# Patient Record
Sex: FEMALE | Race: WHITE
[De-identification: names, ages, dates, MRNs, and addresses within clinical notes are randomized per-mention and may not be internally consistent; named-entity substitution may affect disease eponyms.]

---

## 2019-03-29 ENCOUNTER — HOSPITAL ENCOUNTER (INPATIENT)
Dept: HOSPITAL 74 - YASAS | Age: 44
LOS: 4 days | Discharge: HOME | DRG: 773 | End: 2019-04-02
Attending: SURGERY | Admitting: SURGERY
Payer: COMMERCIAL

## 2019-03-29 VITALS — BODY MASS INDEX: 27.8 KG/M2

## 2019-03-29 DIAGNOSIS — E10.9: ICD-10-CM

## 2019-03-29 DIAGNOSIS — G47.00: ICD-10-CM

## 2019-03-29 DIAGNOSIS — F14.20: ICD-10-CM

## 2019-03-29 DIAGNOSIS — R21: ICD-10-CM

## 2019-03-29 DIAGNOSIS — F25.9: ICD-10-CM

## 2019-03-29 DIAGNOSIS — Z79.4: ICD-10-CM

## 2019-03-29 DIAGNOSIS — Z86.59: ICD-10-CM

## 2019-03-29 DIAGNOSIS — F11.23: Primary | ICD-10-CM

## 2019-03-29 DIAGNOSIS — D72.819: ICD-10-CM

## 2019-03-29 DIAGNOSIS — F19.24: ICD-10-CM

## 2019-03-29 DIAGNOSIS — G62.9: ICD-10-CM

## 2019-03-29 DIAGNOSIS — F31.9: ICD-10-CM

## 2019-03-29 DIAGNOSIS — F17.210: ICD-10-CM

## 2019-03-29 DIAGNOSIS — J45.909: ICD-10-CM

## 2019-03-29 PROCEDURE — HZ2ZZZZ DETOXIFICATION SERVICES FOR SUBSTANCE ABUSE TREATMENT: ICD-10-PCS | Performed by: SURGERY

## 2019-03-29 RX ADMIN — Medication SCH MG: at 22:33

## 2019-03-29 RX ADMIN — INSULIN DETEMIR SCH UNITS: 100 INJECTION, SOLUTION SUBCUTANEOUS at 22:34

## 2019-03-29 RX ADMIN — METFORMIN HYDROCHLORIDE SCH MG: 500 TABLET ORAL at 18:11

## 2019-03-29 RX ADMIN — PREGABALIN SCH MG: 75 CAPSULE ORAL at 22:33

## 2019-03-29 RX ADMIN — Medication PRN MG: at 22:35

## 2019-03-29 RX ADMIN — INSULIN ASPART SCH UNIT: 100 INJECTION, SOLUTION INTRAVENOUS; SUBCUTANEOUS at 22:34

## 2019-03-29 RX ADMIN — RANITIDINE SCH MG: 150 TABLET ORAL at 22:34

## 2019-03-29 NOTE — HP
COWS





- Scale


Resting Pulse: 2= -120


Sweatin= Chills/Flushing


Restless Observation: 5= Unable to Sit Still


Pupil Size: 0= Normal to Room Light


Bone or Joint Aches: 1= Mild Discomfort


Runny Nose/ Eye Tearin= Runny Nose/Eyes


GI Upset > 30mins: 5=Frequent Vomit/Diarrhea


Tremor Observation: 0= None


Yawning Observation: 0= None


Anxiety or Irritability: 1=Feels Anxious/Irritable


Goose Flesh Skin: 0=Smooth Skin


COWS Score: 17





CIWA Score





- Admission Criteria


[facility] Guidelines: Admission for Medically Managed Detox: 


Requires at least one of the followin. CIWA greater than 12


2. Seizures within the past 24 hours


3. Delirium tremens within the past 24 hours


4. Hallucinations within the past 24 hours


5. Acute intervention needed for co  occurring medical disorder


6. Acute intervention needed for co  occurring psychiatric disorder


7. Severe withdrawal that cannot be handled at a lower level of care (continued


    vomiting, continued diarrhea, abnormal vital signs) requiring intravenous


    medication and/or fluids


8. Pregnancy








Admission ROS [facility]


Allergies/Adverse Reactions: 


 Allergies











Allergy/AdvReac Type Severity Reaction Status Date / Time


 


tomato Allergy Intermediate  Verified 19 14:39











History of Present Illness: 





pt here requesting detox for heroin use  , reports use since  age 23  , 

intermittent sobriety  10 years w/ rehab / detox  , latest use yesterday early 

evening , current  symptoms as above, IVDU  in  left UE  , denies  abscess  ., 

denies OD , needles from pharmacy - her  own DM needles - , denies sharing  , 

denies re-using  . heroin use 40 $ /day  = 5  bags/day  . REPORTS  BLACKOUT 2  

WEEKS AGO , THOUGHT WAS USING CANNABIS  , THINKS IT WAS K2  ,  WOKE UP W/ 

BLOODY  KNUCKLES AND  BLOOD  ON CHIN, DENIES  CURRENT SYMPTOMS. 


cocaine : 3 x 20 $ /day  IVDU  


denies  other  illicitsor etoh  


tobacco : 1 ppd  


fen : denies 


PMhx : DM I since   , FM , DM  neuropathy, asthma since  ( Nh/ NI  ) . 

lmp - 4 YEARS AGO , ON iud - MIRENA 


PSHx : alessandro   , C-sx   


psych ; bipolar d/o , depression  , bpd , sad , 


Meds - see list  


SHx :  lives alone  , public assistance , finances  habit  through " [employer]  

[employer] people " ,  legal :  children in foster care, ACS  , court [address]  , 


children ages 11 & 12  A & W  











Search Terms: aby terrell, 1975


Search Date: 2019 02:36:22 PM





The Drug Utilization Report below displays all of the controlled substance 

prescriptions, if any, that your patient has filled in the last twelve months. 

The information displayed on this report is compiled from pharmacy submissions 

to the Department, and accurately reflects the information as submitted by the 

pharmacies.





This report was requested by: Ashley Flores | Reference #: [id number]


Others' Prescriptions


Patient Name:    Aby Terrell    YOB: 1975


Address:    [address] [place] [address]    Sex:    Female


Rx Written    Rx Dispensed    Drug    Quantity    Days Supply    Prescriber Name


2019    lyrica 150 mg capsule    90    30    Dulce Archer MD


2019    lyrica 150 mg capsule    90    30    Dulce Archer MD





Patient Name:    Aby Terrell    YOB: 1975


Address:    [address] [id number]    Sex:    Female


Rx Written    Rx Dispensed    Drug    Quantity    Days Supply    Prescriber Name


2018    lyrica 150 mg capsule    90    30    Rayo Silva)


09/10/2018    2018    lyrica 100 mg capsule    90    30    Gilmer Munoz)


08/10/2018    08/10/2018    lyrica 100 mg capsule    90    30    Gilmer Munoz)


2018    zolpidem tartrate 10 mg tablet    30    30    Deirdre Winslow MD





* - Drugs marked with an asterisk are compound drugs. If the compound drug is 

made up of more than one controlled substance, then each controlled substance 

will be a separate row in the table.





 


Exam Limitations: Clinical Condition





- Ebola screening


Have you traveled outside of the country in the last 21 days: No


Have you had contact with anyone from an Ebola affected area: No


Have you been sick,other than usual withdrawal symptoms: No





- Review of Systems


Constitutional: See HPI


EENT: reports: See HPI, Nose Congestion, Other (myopia)


Respiratory: reports: No Symptoms reported


Cardiac: reports: No Symptoms Reported


GI: reports: See HPI


: reports: No Symptoms Reported


Musculoskeletal: reports: See HPI


Integumentary: reports: See HPI


Neuro: reports: Headache


Endocrine: reports: See HPI


Psychiatric: reports: Orientated x3, Agitated, Anxious





Patient History





- Smoking Cessation


Smoking history: Current every day smoker


Have you smoked in the past 12 months: Yes


Aproximately how many cigarettes per day: 20


Hx Chewing Tobacco Use: No


Initiated information on smoking cessation: No





- Substances Abused


  ** Heroin


Route: Injection


Frequency: Daily


Amount used: 4-5 bags


Age of first use: 24


Date of Last Use: 19





  ** Cocaine


Route: Injection


Frequency: 1-2 times per week


Amount used: $40


Age of first use: 19


Date of Last Use: 19





Family Disease History





- Family Disease History


Family Disease History: Diabetes: Grandparent (MGM ), Other: Mother (PUD ), 

Sister (1 , a & w ), Son (a & w ), Daughter (a & w )





Admission Physical Exam [facility]





- Vital Signs


Vital Signs: 


 Vital Signs - 24 hr











  19





  13:34


 


Temperature 97.5 F L


 


Pulse Rate 109 H


 


Respiratory 20





Rate 


 


Blood Pressure 117/84














- Physical


General Appearance: Yes: Severe Distress, Anxious


HEENTM: Yes: EOMI, Hearing grossly Normal, Normocephalic, Normal Voice, Nasal 

Congestion, Rhinorrhea


Respiratory: Yes: Chest Non-Tender, Lungs Clear, Normal Breath Sounds


Neck: Yes: No masses,lesions,Nodules, Trachea in good position


Cardiology: Yes: Regular Rhythm, Regular Rate, S1, S2, Tachycardia


Abdominal: Yes: Non Tender, Soft


Back: Yes: Normal Inspection


Musculoskeletal: Yes: full range of Motion, Gait Steady


Extremities: Yes: Normal Range of Motion, Non-Tender, Tremors


Neurological: Yes: Motor Strength 5/5, Normal Mood/Affect


Integumentary: Yes: Warm, Track Marks (LEFT  FOREARM / ANTECUBITAL, NO ABSCESS)

, Other (excoriations  on dorsum of  bilateral  hands  , superficial)





- Diagnostic


(1) Opioid dependence


Current Visit: Yes   Status: Acute   


Qualifiers: 


   Substance use status: in withdrawal   Qualified Code(s): F11.23 - Opioid 

dependence with withdrawal   





(2) Cocaine abuse


Current Visit: Yes   Status: Chronic   





(3) Nicotine dependence


Current Visit: Yes   Status: Chronic   


Qualifiers: 


   Nicotine product type: cigarettes 





BHS Breath Alcohol Content


Breath Alcohol Content: 0





Urine Pregancy Test





- Result


Urine Pregnancy Test Results: Negative - NO line present





Urine Drug Screen





- Results


Drug Screen Negative: No


Urine Drug Screen Results: JORGE-Cocaine, OPI-Opiates, FEN-Fentanyl





Inpatient Rehab Admission





- Rehab Decision to Admit


Inpatient rehab admission?: No

## 2019-03-30 LAB
ALBUMIN SERPL-MCNC: 3.1 G/DL (ref 3.4–5)
ALP SERPL-CCNC: 81 U/L (ref 45–117)
ALT SERPL-CCNC: 15 U/L (ref 13–61)
ANION GAP SERPL CALC-SCNC: 7 MMOL/L (ref 8–16)
AST SERPL-CCNC: 10 U/L (ref 15–37)
BILIRUB SERPL-MCNC: 0.4 MG/DL (ref 0.2–1)
BUN SERPL-MCNC: 10 MG/DL (ref 7–18)
CALCIUM SERPL-MCNC: 9.1 MG/DL (ref 8.5–10.1)
CHLORIDE SERPL-SCNC: 102 MMOL/L (ref 98–107)
CO2 SERPL-SCNC: 30 MMOL/L (ref 21–32)
CREAT SERPL-MCNC: 0.5 MG/DL (ref 0.55–1.3)
DEPRECATED RDW RBC AUTO: 13.7 % (ref 11.6–15.6)
GLUCOSE SERPL-MCNC: 82 MG/DL (ref 74–106)
HCT VFR BLD CALC: 43.9 % (ref 32.4–45.2)
HGB BLD-MCNC: 14.5 GM/DL (ref 10.7–15.3)
MCH RBC QN AUTO: 30.4 PG (ref 25.7–33.7)
MCHC RBC AUTO-ENTMCNC: 33 G/DL (ref 32–36)
MCV RBC: 91.9 FL (ref 80–96)
PLATELET # BLD AUTO: 236 K/MM3 (ref 134–434)
PMV BLD: 9.6 FL (ref 7.5–11.1)
POTASSIUM SERPLBLD-SCNC: 3.6 MMOL/L (ref 3.5–5.1)
PROT SERPL-MCNC: 6.1 G/DL (ref 6.4–8.2)
RBC # BLD AUTO: 4.78 M/MM3 (ref 3.6–5.2)
SODIUM SERPL-SCNC: 138 MMOL/L (ref 136–145)
WBC # BLD AUTO: 12.2 K/MM3 (ref 4–10)

## 2019-03-30 RX ADMIN — PREGABALIN SCH MG: 75 CAPSULE ORAL at 13:35

## 2019-03-30 RX ADMIN — INSULIN ASPART SCH: 100 INJECTION, SOLUTION INTRAVENOUS; SUBCUTANEOUS at 07:35

## 2019-03-30 RX ADMIN — PREGABALIN SCH MG: 75 CAPSULE ORAL at 22:13

## 2019-03-30 RX ADMIN — RANITIDINE SCH MG: 150 TABLET ORAL at 10:31

## 2019-03-30 RX ADMIN — METFORMIN HYDROCHLORIDE SCH MG: 500 TABLET ORAL at 17:17

## 2019-03-30 RX ADMIN — INSULIN DETEMIR SCH UNITS: 100 INJECTION, SOLUTION SUBCUTANEOUS at 22:13

## 2019-03-30 RX ADMIN — METFORMIN HYDROCHLORIDE SCH MG: 500 TABLET ORAL at 07:35

## 2019-03-30 RX ADMIN — Medication PRN MG: at 22:14

## 2019-03-30 RX ADMIN — Medication SCH MG: at 22:13

## 2019-03-30 RX ADMIN — INSULIN ASPART SCH: 100 INJECTION, SOLUTION INTRAVENOUS; SUBCUTANEOUS at 21:48

## 2019-03-30 RX ADMIN — SITAGLIPTIN SCH MG: 100 TABLET, FILM COATED ORAL at 07:35

## 2019-03-30 RX ADMIN — RANITIDINE SCH MG: 150 TABLET ORAL at 22:13

## 2019-03-30 RX ADMIN — INSULIN ASPART SCH UNIT: 100 INJECTION, SOLUTION INTRAVENOUS; SUBCUTANEOUS at 17:17

## 2019-03-30 RX ADMIN — PANTOPRAZOLE SODIUM SCH MG: 40 TABLET, DELAYED RELEASE ORAL at 10:31

## 2019-03-30 RX ADMIN — INSULIN ASPART SCH: 100 INJECTION, SOLUTION INTRAVENOUS; SUBCUTANEOUS at 11:41

## 2019-03-30 RX ADMIN — Medication SCH TAB: at 10:31

## 2019-03-30 RX ADMIN — PREGABALIN SCH MG: 75 CAPSULE ORAL at 05:29

## 2019-03-30 NOTE — CONSULT
[facility] Psychiatric Consult





- Data


Date of interview: 03/30/19


Admission source: [facility]


Identifying data: First admission to [facility] for this 42 y/o  female 

self-referred for detoxification treatment (heroin, cocaine). Examined on [address] 

[address]. Patient is single, a mother of two, domiciled, unemployed and supported 

on Public Assistance.


Substance Abuse History: Confirmed by the patient. Ms Terrell endorses an 

extensive history of subtance use (onset of cocaine abuse at age 19 + heroin 

abuse since age 24). Both drugs are used IVDU. patient smokes one pack of 

cigaretes daily. Additional details in current BHS report as follows : Smoking 

Cessation.  Smoking history: Current every day smoker.  Have you smoked in the 

past 12 months: Yes.  Aproximately how many cigarettes per day: 20.  Hx Chewing 

Tobacco Use: No.  Initiated information on smoking cessation: No.  - Substances 

Abused.  ** Heroin.  Route: Injection.  Frequency: Daily.  Amount used: 4-5 

bags.  Age of first use: 24.  Date of Last Use: 03/28/19.  ** Cocaine.  Route: 

Injection.  Frequency: 1-2 times per week.  Amount used: $40.  Age of first use

: 19.  Date of Last Use: 03/28/19


Medical History: Consistent with diabetes mellitus, bronchial asthma 

fibromyalgia and peripheral neuropathy.


Psychiatric History: Patient reports a history of 3-5 psychiatric 

hospitalizations ([facility], [facility]). First 

psychiatric hospitalization was in 2017 (self-report). Diagnosed with 

Schizoaffective Disorder + PTSD. Ms Terrell endorses treatment with lithium 600 mg

/bid + risperdal 3 mg/bid + klonopin (dose unknown) and zolpidem 10 mg/hs. Sees 

a psychiatrist, Dr Dahl, at the [facility] at [address] [place] [address] in [facility].


Physical/Sexual Abuse/Trauma History: Not discussed. Patient declines.


Additional Comment: Urine Drug Screen Results: JORGE-Cocaine, OPI-Opiates, FEN-

Fentanyl. Noted.





Mental Status Exam





- Mental Status Exam


Alert and Oriented to: Time, Place, Person


Cognitive Function: Good


Patient Appearance: Well Groomed


Mood: Nervous, Withdrawn, Anxious


Affect: Mood Congruent, Constricted


Patient Behavior: Fatigued, Appropriate, Cooperative


Speech Pattern: Clear


Voice Loudness: Normal


Thought Process: Goal Oriented


Thought Disorder: Not Present


Hallucinations: Denies


Suicidal Ideation: Denies


Homicidal Ideation: Denies


Insight/Judgement: Poor


Sleep: Poorly, Difficulty falling asleep


Appetite: Good


Gait/Station: Normal





Psychiatric Findings





- Problem List (Axis 1, 2,3)


(1) Opioid dependence


Current Visit: Yes   Status: Chronic   


Qualifiers: 


   Substance use status: in withdrawal   Qualified Code(s): F11.23 - Opioid 

dependence with withdrawal   





(2) Nicotine dependence


Current Visit: Yes   Status: Chronic   


Qualifiers: 


   Nicotine product type: cigarettes 





(3) Cocaine dependence


Current Visit: Yes   Status: Chronic   





(4) Substance induced mood disorder


Current Visit: Yes   Status: Chronic   





(5) Schizoaffective disorder


Current Visit: Yes   Status: Chronic   Comment: By history. On medications.    





(6) History of posttraumatic stress disorder (PTSD)


Current Visit: Yes   Status: Chronic   





(7) Insomnia


Current Visit: Yes   Status: Chronic   





- Initial Treatment Plan


Initial Treatment Plan: Psychoeducation. Sleep hygiene. Detoxification. NA 

meetings. Support. Groups. Relapse prevention : discussed with patient. 

Medications confirmed via review of most recent pharmacy claims (noted refills 

for lithium, trileptal, risperdal issued on 3/25/19 by Dr Joesph carias)

. Lithium is held (level is requested/pending). Resumed risperdal 2 mg po hs + 

1 mg po am and oxcarbazepine 600 mg po bid. Side effects/benefits of both drugs 

are discussed with the patient. This includes the risk of abnormal involuntary 

movements, endocrine issues (galactorrhea, gynecomastia, sexual dysfunction), 

akathisia, dyskinesia, neuroleptic malignant syndrome, cardiovascular adverse 

events and hyponatremia (trileptal). Patient expressed her agreement with this 

plan of care. Consent (verbal) given to MD. De Jesus.

## 2019-03-30 NOTE — PN
BHS COWS





- Scale


Resting Pulse: 1= HI 


Sweatin=Flushed/Facial Moisture


Restless Observation: 1= Difficult to Sit Still


Pupil Size: 0= Normal to Room Light


Bone or Joint Aches: 2= Severe Diffuse Aches


Runny Nose/ Eye Tearin= Nasal Congestion


GI Upset > 30mins: 2= Nausea/Diarrhea


Tremor Observation of Outstretched Hands: 2= Slight Tremor Visible


Yawning Observation: 0= None


Anxiety or Irritability: 2=Irritable/Anxious


Goose Flesh Skin: 3=Piloerection


COWS Score: 16





BHS Progress Note (SOAP)


Subjective: 





Tremors, sweats, body aches, nausea


Objective: 





19 14:53


 Vital Signs - 8 hr











  19





  08:34 09:47 14:36


 


Temperature 98.1 F 98.2 F 98.4 F


 


Pulse Rate 95 H 107 H 96 H


 


Respiratory 20 16 18





Rate   


 


Blood Pressure 106/71 107/75 122/69








 Laboratory Last Values











WBC  12.2 K/mm3 (4.0-10.0)  H  19  07:45    


 


RBC  4.78 M/mm3 (3.60-5.2)   19  07:45    


 


Hgb  14.5 GM/dL (10.7-15.3)   19  07:45    


 


Hct  43.9 % (32.4-45.2)   19  07:45    


 


MCV  91.9 fl (80-96)   19  07:45    


 


MCH  30.4 pg (25.7-33.7)   19  07:45    


 


MCHC  33.0 g/dl (32.0-36.0)   19  07:45    


 


RDW  13.7 % (11.6-15.6)   19  07:45    


 


Plt Count  236 K/MM3 (134-434)   19  07:45    


 


MPV  9.6 fl (7.5-11.1)   19  07:45    


 


Sodium  138 mmol/L (136-145)   19  07:45    


 


Potassium  3.6 mmol/L (3.5-5.1)   19  07:45    


 


Chloride  102 mmol/L ()   19  07:45    


 


Carbon Dioxide  30 mmol/L (21-32)   19  07:45    


 


Anion Gap  7 MMOL/L (8-16)  L  19  07:45    


 


BUN  10 mg/dL (7-18)   19  07:45    


 


Creatinine  0.5 mg/dL (0.55-1.3)  L  19  07:45    


 


Creat Clearance w eGFR  135.97  (>60)   19  07:45    


 


POC Glucometer  186 UNITS ()   19  11:39    


 


Random Glucose  82 mg/dL ()   19  07:45    


 


Calcium  9.1 mg/dL (8.5-10.1)   19  07:45    


 


Total Bilirubin  0.4 mg/dL (0.2-1)   19  07:45    


 


AST  10 U/L (15-37)  L  19  07:45    


 


ALT  15 U/L (13-61)   19  07:45    


 


Alkaline Phosphatase  81 U/L ()   19  07:45    


 


Total Protein  6.1 g/dl (6.4-8.2)  L  19  07:45    


 


Albumin  3.1 g/dl (3.4-5.0)  L  19  07:45    


 


RPR Titer  Nonreactive  (NONREACTIVE)   19  07:45    








Labs noted


Elevated white count, denies symptoms


VSS, no cough, dysurea or open wounds


Assessment: 





19 14:55


Withdrawal sx


Leukocytosis


Plan: 





Continue detox


Increase PO fluids


Repeat CBC


UA pending

## 2019-03-31 LAB
BACTERIA #/AREA URNS HPF: (no result) /HPF
EPITH CASTS URNS QL MICRO: (no result) /HPF (ref 0–5)
LEUKOCYTE ESTERASE UR QL STRIP.AUTO: (no result)
PH UR: 6.5 [PH] (ref 5–8)
RBC # BLD AUTO: (no result) /HPF (ref 0–4)
SP GR UR: 1.01 (ref 1.01–1.03)
UROBILINOGEN UR STRIP-MCNC: 0.2 MG/DL (ref 0.2–1)
WBC # UR AUTO: (no result) /HPF (ref 0–5)

## 2019-03-31 RX ADMIN — RISPERIDONE SCH MG: 1 TABLET, FILM COATED ORAL at 10:26

## 2019-03-31 RX ADMIN — CLOTRIMAZOLE SCH APPLIC: 1 CREAM TOPICAL at 22:04

## 2019-03-31 RX ADMIN — PREGABALIN SCH MG: 75 CAPSULE ORAL at 13:28

## 2019-03-31 RX ADMIN — METFORMIN HYDROCHLORIDE SCH MG: 500 TABLET ORAL at 08:11

## 2019-03-31 RX ADMIN — INSULIN ASPART SCH UNIT: 100 INJECTION, SOLUTION INTRAVENOUS; SUBCUTANEOUS at 22:04

## 2019-03-31 RX ADMIN — INSULIN ASPART SCH: 100 INJECTION, SOLUTION INTRAVENOUS; SUBCUTANEOUS at 17:57

## 2019-03-31 RX ADMIN — PREGABALIN SCH MG: 75 CAPSULE ORAL at 05:54

## 2019-03-31 RX ADMIN — SITAGLIPTIN SCH MG: 100 TABLET, FILM COATED ORAL at 08:11

## 2019-03-31 RX ADMIN — INSULIN ASPART SCH UNIT: 100 INJECTION, SOLUTION INTRAVENOUS; SUBCUTANEOUS at 11:09

## 2019-03-31 RX ADMIN — PANTOPRAZOLE SODIUM SCH MG: 40 TABLET, DELAYED RELEASE ORAL at 10:26

## 2019-03-31 RX ADMIN — RANITIDINE SCH MG: 150 TABLET ORAL at 22:02

## 2019-03-31 RX ADMIN — Medication PRN MG: at 22:02

## 2019-03-31 RX ADMIN — RANITIDINE SCH MG: 150 TABLET ORAL at 10:26

## 2019-03-31 RX ADMIN — INSULIN ASPART SCH: 100 INJECTION, SOLUTION INTRAVENOUS; SUBCUTANEOUS at 08:12

## 2019-03-31 RX ADMIN — Medication SCH MG: at 22:02

## 2019-03-31 RX ADMIN — PREGABALIN SCH MG: 75 CAPSULE ORAL at 22:02

## 2019-03-31 RX ADMIN — METFORMIN HYDROCHLORIDE SCH: 500 TABLET ORAL at 17:57

## 2019-03-31 RX ADMIN — CLOTRIMAZOLE SCH: 1 CREAM TOPICAL at 16:07

## 2019-03-31 RX ADMIN — Medication SCH TAB: at 10:26

## 2019-03-31 RX ADMIN — INSULIN DETEMIR SCH UNITS: 100 INJECTION, SOLUTION SUBCUTANEOUS at 22:03

## 2019-03-31 NOTE — PN
BHS COWS





- Scale


Resting Pulse: 1= MT 


Sweatin=Flushed/Facial Moisture


Restless Observation: 1= Difficult to Sit Still


Pupil Size: 0= Normal to Room Light


Bone or Joint Aches: 2= Severe Diffuse Aches


Runny Nose/ Eye Tearin= Runny Nose/Eyes


GI Upset > 30mins: 0= None


Tremor Observation of Outstretched Hands: 2= Slight Tremor Visible


Yawning Observation: 1= 1-2x During Session


Anxiety or Irritability: 1=Feels Anxious/Irritable


Goose Flesh Skin: 0=Smooth Skin


COWS Score: 12





BHS Progress Note (SOAP)


Subjective: 





shakes


sweats


body aches


right upper arm rash 


Objective: 





19 13:48


 Vital Signs











Temperature  98.2 F   19 10:03


 


Pulse Rate  89   19 10:03


 


Respiratory Rate  18   19 10:03


 


Blood Pressure  102/74   19 10:03


 


O2 Sat by Pulse Oximetry (%)      








 Laboratory Tests











  19





  15:02 18:08 21:25


 


WBC   


 


RBC   


 


Hgb   


 


Hct   


 


MCV   


 


MCH   


 


MCHC   


 


RDW   


 


Plt Count   


 


MPV   


 


Sodium   


 


Potassium   


 


Chloride   


 


Carbon Dioxide   


 


Anion Gap   


 


BUN   


 


Creatinine   


 


Creat Clearance w eGFR   


 


POC Glucometer  274  360  291


 


Random Glucose   


 


Calcium   


 


Total Bilirubin   


 


AST   


 


ALT   


 


Alkaline Phosphatase   


 


Total Protein   


 


Albumin   


 


Urine Color   


 


Urine Appearance   


 


Urine pH   


 


Ur Specific Gravity   


 


Urine Protein   


 


Urine Glucose (UA)   


 


Urine Ketones   


 


Urine Blood   


 


Urine Nitrite   


 


Urine Bilirubin   


 


Urine Urobilinogen   


 


Ur Leukocyte Esterase   


 


RPR Titer   














  19





  05:28 07:45 07:45


 


WBC   12.2 H 


 


RBC   4.78 


 


Hgb   14.5 


 


Hct   43.9 


 


MCV   91.9 


 


MCH   30.4 


 


MCHC   33.0 


 


RDW   13.7 


 


Plt Count   236 


 


MPV   9.6 


 


Sodium    138


 


Potassium    3.6


 


Chloride    102


 


Carbon Dioxide    30


 


Anion Gap    7 L


 


BUN    10


 


Creatinine    0.5 L


 


Creat Clearance w eGFR    135.97


 


POC Glucometer  97  


 


Random Glucose    82


 


Calcium    9.1


 


Total Bilirubin    0.4


 


AST    10 L


 


ALT    15


 


Alkaline Phosphatase    81


 


Total Protein    6.1 L


 


Albumin    3.1 L


 


Urine Color   


 


Urine Appearance   


 


Urine pH   


 


Ur Specific Gravity   


 


Urine Protein   


 


Urine Glucose (UA)   


 


Urine Ketones   


 


Urine Blood   


 


Urine Nitrite   


 


Urine Bilirubin   


 


Urine Urobilinogen   


 


Ur Leukocyte Esterase   


 


RPR Titer   














  19





  07:45 11:39 16:22


 


WBC   


 


RBC   


 


Hgb   


 


Hct   


 


MCV   


 


MCH   


 


MCHC   


 


RDW   


 


Plt Count   


 


MPV   


 


Sodium   


 


Potassium   


 


Chloride   


 


Carbon Dioxide   


 


Anion Gap   


 


BUN   


 


Creatinine   


 


Creat Clearance w eGFR   


 


POC Glucometer   186  298


 


Random Glucose   


 


Calcium   


 


Total Bilirubin   


 


AST   


 


ALT   


 


Alkaline Phosphatase   


 


Total Protein   


 


Albumin   


 


Urine Color   


 


Urine Appearance   


 


Urine pH   


 


Ur Specific Gravity   


 


Urine Protein   


 


Urine Glucose (UA)   


 


Urine Ketones   


 


Urine Blood   


 


Urine Nitrite   


 


Urine Bilirubin   


 


Urine Urobilinogen   


 


Ur Leukocyte Esterase   


 


RPR Titer  Nonreactive  














  19





  21:38 06:47 07:50


 


WBC   


 


RBC   


 


Hgb   


 


Hct   


 


MCV   


 


MCH   


 


MCHC   


 


RDW   


 


Plt Count   


 


MPV   


 


Sodium   


 


Potassium   


 


Chloride   


 


Carbon Dioxide   


 


Anion Gap   


 


BUN   


 


Creatinine   


 


Creat Clearance w eGFR   


 


POC Glucometer  183  114 


 


Random Glucose   


 


Calcium   


 


Total Bilirubin   


 


AST   


 


ALT   


 


Alkaline Phosphatase   


 


Total Protein   


 


Albumin   


 


Urine Color    Yellow


 


Urine Appearance    Clear


 


Urine pH    6.5


 


Ur Specific Gravity    1.007 L


 


Urine Protein    Negative


 


Urine Glucose (UA)    Negative


 


Urine Ketones    Negative


 


Urine Blood    Negative


 


Urine Nitrite    Negative


 


Urine Bilirubin    Negative


 


Urine Urobilinogen    0.2


 


Ur Leukocyte Esterase    1+ H


 


RPR Titer   














  19





  11:08


 


WBC 


 


RBC 


 


Hgb 


 


Hct 


 


MCV 


 


MCH 


 


MCHC 


 


RDW 


 


Plt Count 


 


MPV 


 


Sodium 


 


Potassium 


 


Chloride 


 


Carbon Dioxide 


 


Anion Gap 


 


BUN 


 


Creatinine 


 


Creat Clearance w eGFR 


 


POC Glucometer  232


 


Random Glucose 


 


Calcium 


 


Total Bilirubin 


 


AST 


 


ALT 


 


Alkaline Phosphatase 


 


Total Protein 


 


Albumin 


 


Urine Color 


 


Urine Appearance 


 


Urine pH 


 


Ur Specific Gravity 


 


Urine Protein 


 


Urine Glucose (UA) 


 


Urine Ketones 


 


Urine Blood 


 


Urine Nitrite 


 


Urine Bilirubin 


 


Urine Urobilinogen 


 


Ur Leukocyte Esterase 


 


RPR Titer 








aaox3


ambulating


no acute distress


Assessment: 





19 13:48


withdrawal sx


rash to right upper arm appears to be a ringworm/red and circular


Plan: 





continue detox


increase fluids


lotrimin cream ordered

## 2019-04-01 LAB
BASOPHILS # BLD: 0.3 % (ref 0–2)
DEPRECATED RDW RBC AUTO: 13.7 % (ref 11.6–15.6)
EOSINOPHIL # BLD: 2 % (ref 0–4.5)
HCT VFR BLD CALC: 43.8 % (ref 32.4–45.2)
HGB BLD-MCNC: 14.4 GM/DL (ref 10.7–15.3)
LYMPHOCYTES # BLD: 41.7 % (ref 8–40)
MCH RBC QN AUTO: 30.8 PG (ref 25.7–33.7)
MCHC RBC AUTO-ENTMCNC: 32.8 G/DL (ref 32–36)
MCV RBC: 93.9 FL (ref 80–96)
MONOCYTES # BLD AUTO: 7.3 % (ref 3.8–10.2)
NEUTROPHILS # BLD: 48.7 % (ref 42.8–82.8)
PLATELET # BLD AUTO: 222 K/MM3 (ref 134–434)
PMV BLD: 9.6 FL (ref 7.5–11.1)
RBC # BLD AUTO: 4.67 M/MM3 (ref 3.6–5.2)
WBC # BLD AUTO: 10.9 K/MM3 (ref 4–10)

## 2019-04-01 RX ADMIN — INSULIN ASPART SCH: 100 INJECTION, SOLUTION INTRAVENOUS; SUBCUTANEOUS at 06:59

## 2019-04-01 RX ADMIN — CLOTRIMAZOLE SCH APPLIC: 1 CREAM TOPICAL at 10:15

## 2019-04-01 RX ADMIN — SITAGLIPTIN SCH MG: 100 TABLET, FILM COATED ORAL at 06:15

## 2019-04-01 RX ADMIN — Medication SCH TAB: at 10:15

## 2019-04-01 RX ADMIN — INSULIN ASPART SCH: 100 INJECTION, SOLUTION INTRAVENOUS; SUBCUTANEOUS at 11:26

## 2019-04-01 RX ADMIN — INSULIN DETEMIR SCH UNITS: 100 INJECTION, SOLUTION SUBCUTANEOUS at 22:23

## 2019-04-01 RX ADMIN — METFORMIN HYDROCHLORIDE SCH MG: 500 TABLET ORAL at 06:15

## 2019-04-01 RX ADMIN — Medication PRN MG: at 22:19

## 2019-04-01 RX ADMIN — PANTOPRAZOLE SODIUM SCH MG: 40 TABLET, DELAYED RELEASE ORAL at 10:15

## 2019-04-01 RX ADMIN — RISPERIDONE SCH MG: 1 TABLET, FILM COATED ORAL at 10:15

## 2019-04-01 RX ADMIN — INSULIN ASPART SCH UNIT: 100 INJECTION, SOLUTION INTRAVENOUS; SUBCUTANEOUS at 17:00

## 2019-04-01 RX ADMIN — METFORMIN HYDROCHLORIDE SCH MG: 500 TABLET ORAL at 16:58

## 2019-04-01 RX ADMIN — RANITIDINE SCH MG: 150 TABLET ORAL at 10:15

## 2019-04-01 RX ADMIN — BACITRACIN ZINC SCH GM: 500 OINTMENT TOPICAL at 11:21

## 2019-04-01 RX ADMIN — CLOTRIMAZOLE SCH APPLIC: 1 CREAM TOPICAL at 22:17

## 2019-04-01 RX ADMIN — PREGABALIN SCH MG: 75 CAPSULE ORAL at 06:15

## 2019-04-01 RX ADMIN — RANITIDINE SCH MG: 150 TABLET ORAL at 22:18

## 2019-04-01 RX ADMIN — PREGABALIN SCH MG: 75 CAPSULE ORAL at 22:18

## 2019-04-01 RX ADMIN — PREGABALIN SCH MG: 75 CAPSULE ORAL at 13:33

## 2019-04-01 RX ADMIN — INSULIN ASPART SCH: 100 INJECTION, SOLUTION INTRAVENOUS; SUBCUTANEOUS at 22:22

## 2019-04-01 RX ADMIN — Medication SCH MG: at 22:18

## 2019-04-01 NOTE — PN
[facility] Progress Note (SOAP)


Subjective: 





blister on my finger burst


feeling better


sweats


Objective: 





04/01/19 10:24


 Vital Signs











Temperature  98.8 F   04/01/19 08:58


 


Pulse Rate  96 H  04/01/19 08:58


 


Respiratory Rate  18   04/01/19 08:58


 


Blood Pressure  98/67   04/01/19 08:58


 


O2 Sat by Pulse Oximetry (%)      








 Laboratory Tests











  03/29/19 03/29/19 03/29/19





  15:02 18:08 21:25


 


WBC   


 


RBC   


 


Hgb   


 


Hct   


 


MCV   


 


MCH   


 


MCHC   


 


RDW   


 


Plt Count   


 


MPV   


 


Absolute Neuts (auto)   


 


Neutrophils %   


 


Lymphocytes %   


 


Monocytes %   


 


Eosinophils %   


 


Basophils %   


 


Nucleated RBC %   


 


Sodium   


 


Potassium   


 


Chloride   


 


Carbon Dioxide   


 


Anion Gap   


 


BUN   


 


Creatinine   


 


Creat Clearance w eGFR   


 


POC Glucometer  274  [phone]  291


 


Random Glucose   


 


Calcium   


 


Total Bilirubin   


 


AST   


 


ALT   


 


Alkaline Phosphatase   


 


Total Protein   


 


Albumin   


 


Urine Color   


 


Urine Appearance   


 


Urine pH   


 


Ur Specific Gravity   


 


Urine Protein   


 


Urine Glucose (UA)   


 


Urine Ketones   


 


Urine Blood   


 


Urine Nitrite   


 


Urine Bilirubin   


 


Urine Urobilinogen   


 


Ur Leukocyte Esterase   


 


Urine WBC (Auto)   


 


Urine RBC (Auto)   


 


Urine Casts (Auto)   


 


U Epithel Cells (Auto)   


 


Urine Bacteria (Auto)   


 


RPR Titer   














  03/30/19 03/30/19 03/30/19





  05:28 07:45 07:45


 


WBC   12.2 H 


 


RBC   4.78 


 


Hgb   14.5 


 


Hct   43.9 


 


MCV   91.9 


 


MCH   30.4 


 


MCHC   33.0 


 


RDW   13.7 


 


Plt Count   236 


 


MPV   9.6 


 


Absolute Neuts (auto)   


 


Neutrophils %   


 


Lymphocytes %   


 


Monocytes %   


 


Eosinophils %   


 


Basophils %   


 


Nucleated RBC %   


 


Sodium    138


 


Potassium    3.6


 


Chloride    102


 


Carbon Dioxide    30


 


Anion Gap    7 L


 


BUN    10


 


Creatinine    0.5 L


 


Creat Clearance w eGFR    135.97


 


POC Glucometer  97  


 


Random Glucose    82


 


Calcium    9.1


 


Total Bilirubin    0.4


 


AST    10 L


 


ALT    15


 


Alkaline Phosphatase    81


 


Total Protein    6.1 L


 


Albumin    3.1 L


 


Urine Color   


 


Urine Appearance   


 


Urine pH   


 


Ur Specific Gravity   


 


Urine Protein   


 


Urine Glucose (UA)   


 


Urine Ketones   


 


Urine Blood   


 


Urine Nitrite   


 


Urine Bilirubin   


 


Urine Urobilinogen   


 


Ur Leukocyte Esterase   


 


Urine WBC (Auto)   


 


Urine RBC (Auto)   


 


Urine Casts (Auto)   


 


U Epithel Cells (Auto)   


 


Urine Bacteria (Auto)   


 


RPR Titer   














  03/30/19 03/30/19 03/30/19





  07:45 11:39 16:22


 


WBC   


 


RBC   


 


Hgb   


 


Hct   


 


MCV   


 


MCH   


 


MCHC   


 


RDW   


 


Plt Count   


 


MPV   


 


Absolute Neuts (auto)   


 


Neutrophils %   


 


Lymphocytes %   


 


Monocytes %   


 


Eosinophils %   


 


Basophils %   


 


Nucleated RBC %   


 


Sodium   


 


Potassium   


 


Chloride   


 


Carbon Dioxide   


 


Anion Gap   


 


BUN   


 


Creatinine   


 


Creat Clearance w eGFR   


 


POC Glucometer   186  298


 


Random Glucose   


 


Calcium   


 


Total Bilirubin   


 


AST   


 


ALT   


 


Alkaline Phosphatase   


 


Total Protein   


 


Albumin   


 


Urine Color   


 


Urine Appearance   


 


Urine pH   


 


Ur Specific Gravity   


 


Urine Protein   


 


Urine Glucose (UA)   


 


Urine Ketones   


 


Urine Blood   


 


Urine Nitrite   


 


Urine Bilirubin   


 


Urine Urobilinogen   


 


Ur Leukocyte Esterase   


 


Urine WBC (Auto)   


 


Urine RBC (Auto)   


 


Urine Casts (Auto)   


 


U Epithel Cells (Auto)   


 


Urine Bacteria (Auto)   


 


RPR Titer  Nonreactive  














  03/30/19 03/31/19 03/31/19





  21:38 06:47 07:50


 


WBC   


 


RBC   


 


Hgb   


 


Hct   


 


MCV   


 


MCH   


 


MCHC   


 


RDW   


 


Plt Count   


 


MPV   


 


Absolute Neuts (auto)   


 


Neutrophils %   


 


Lymphocytes %   


 


Monocytes %   


 


Eosinophils %   


 


Basophils %   


 


Nucleated RBC %   


 


Sodium   


 


Potassium   


 


Chloride   


 


Carbon Dioxide   


 


Anion Gap   


 


BUN   


 


Creatinine   


 


Creat Clearance w eGFR   


 


POC Glucometer  183  114 


 


Random Glucose   


 


Calcium   


 


Total Bilirubin   


 


AST   


 


ALT   


 


Alkaline Phosphatase   


 


Total Protein   


 


Albumin   


 


Urine Color    Yellow


 


Urine Appearance    Clear


 


Urine pH    6.5


 


Ur Specific Gravity    1.010


 


Urine Protein    Negative


 


Urine Glucose (UA)    Negative


 


Urine Ketones    Negative


 


Urine Blood    Negative


 


Urine Nitrite    Negative


 


Urine Bilirubin    Negative


 


Urine Urobilinogen    0.2


 


Ur Leukocyte Esterase    1+ H


 


Urine WBC (Auto)    0-5


 


Urine RBC (Auto)    0-3


 


Urine Casts (Auto)    No Result Required.


 


U Epithel Cells (Auto)    0-5


 


Urine Bacteria (Auto)    1+


 


RPR Titer   














  03/31/19 03/31/19 04/01/19





  11:08 21:58 06:14


 


WBC   


 


RBC   


 


Hgb   


 


Hct   


 


MCV   


 


MCH   


 


MCHC   


 


RDW   


 


Plt Count   


 


MPV   


 


Absolute Neuts (auto)   


 


Neutrophils %   


 


Lymphocytes %   


 


Monocytes %   


 


Eosinophils %   


 


Basophils %   


 


Nucleated RBC %   


 


Sodium   


 


Potassium   


 


Chloride   


 


Carbon Dioxide   


 


Anion Gap   


 


BUN   


 


Creatinine   


 


Creat Clearance w eGFR   


 


POC Glucometer  232  316  87


 


Random Glucose   


 


Calcium   


 


Total Bilirubin   


 


AST   


 


ALT   


 


Alkaline Phosphatase   


 


Total Protein   


 


Albumin   


 


Urine Color   


 


Urine Appearance   


 


Urine pH   


 


Ur Specific Gravity   


 


Urine Protein   


 


Urine Glucose (UA)   


 


Urine Ketones   


 


Urine Blood   


 


Urine Nitrite   


 


Urine Bilirubin   


 


Urine Urobilinogen   


 


Ur Leukocyte Esterase   


 


Urine WBC (Auto)   


 


Urine RBC (Auto)   


 


Urine Casts (Auto)   


 


U Epithel Cells (Auto)   


 


Urine Bacteria (Auto)   


 


RPR Titer   














  04/01/19





  07:00


 


WBC  10.9 H


 


RBC  4.67


 


Hgb  14.4


 


Hct  43.8


 


MCV  93.9


 


MCH  30.8


 


MCHC  32.8


 


RDW  13.7


 


Plt Count  222


 


MPV  9.6


 


Absolute Neuts (auto)  5.3


 


Neutrophils %  48.7


 


Lymphocytes %  41.7 H


 


Monocytes %  7.3


 


Eosinophils %  2.0


 


Basophils %  0.3


 


Nucleated RBC %  0


 


Sodium 


 


Potassium 


 


Chloride 


 


Carbon Dioxide 


 


Anion Gap 


 


BUN 


 


Creatinine 


 


Creat Clearance w eGFR 


 


POC Glucometer 


 


Random Glucose 


 


Calcium 


 


Total Bilirubin 


 


AST 


 


ALT 


 


Alkaline Phosphatase 


 


Total Protein 


 


Albumin 


 


Urine Color 


 


Urine Appearance 


 


Urine pH 


 


Ur Specific Gravity 


 


Urine Protein 


 


Urine Glucose (UA) 


 


Urine Ketones 


 


Urine Blood 


 


Urine Nitrite 


 


Urine Bilirubin 


 


Urine Urobilinogen 


 


Ur Leukocyte Esterase 


 


Urine WBC (Auto) 


 


Urine RBC (Auto) 


 


Urine Casts (Auto) 


 


U Epithel Cells (Auto) 


 


Urine Bacteria (Auto) 


 


RPR Titer 








[id number]


ambulating


no acute distress








Assessment: 





04/01/19 10:29


mild withdrawal sx


Plan: 





continue detox


increase fluids


bacitracin oint 


d/c in am

## 2019-04-02 VITALS — SYSTOLIC BLOOD PRESSURE: 97 MMHG | TEMPERATURE: 98.1 F | DIASTOLIC BLOOD PRESSURE: 61 MMHG | HEART RATE: 88 BPM

## 2019-04-02 RX ADMIN — PANTOPRAZOLE SODIUM SCH MG: 40 TABLET, DELAYED RELEASE ORAL at 10:03

## 2019-04-02 RX ADMIN — RANITIDINE SCH MG: 150 TABLET ORAL at 10:02

## 2019-04-02 RX ADMIN — RISPERIDONE SCH MG: 1 TABLET, FILM COATED ORAL at 10:02

## 2019-04-02 RX ADMIN — INSULIN ASPART SCH: 100 INJECTION, SOLUTION INTRAVENOUS; SUBCUTANEOUS at 11:13

## 2019-04-02 RX ADMIN — SITAGLIPTIN SCH MG: 100 TABLET, FILM COATED ORAL at 07:16

## 2019-04-02 RX ADMIN — PREGABALIN SCH MG: 75 CAPSULE ORAL at 05:30

## 2019-04-02 RX ADMIN — METFORMIN HYDROCHLORIDE SCH MG: 500 TABLET ORAL at 07:16

## 2019-04-02 RX ADMIN — INSULIN ASPART SCH: 100 INJECTION, SOLUTION INTRAVENOUS; SUBCUTANEOUS at 07:17

## 2019-04-02 RX ADMIN — CLOTRIMAZOLE SCH: 1 CREAM TOPICAL at 10:03

## 2019-04-02 RX ADMIN — BACITRACIN ZINC SCH GM: 500 OINTMENT TOPICAL at 10:03

## 2019-04-02 RX ADMIN — Medication SCH TAB: at 10:03

## 2019-04-02 NOTE — DS
[facility] Detox Discharge Summary


Admission Date: 


03/29/19





Discharge Date: 04/02/19





- History


Present History: Cocaine Dependence, Opioid Dependence





- Physical Exam Results


Vital Signs: 


 Vital Signs











Temperature  97.9 F   04/02/19 06:00


 


Pulse Rate  98 H  04/02/19 06:00


 


Respiratory Rate  18   04/02/19 06:00


 


Blood Pressure  106/65   04/02/19 06:00


 


O2 Sat by Pulse Oximetry (%)      














- Treatment


Hospital Course: Detox Protocol Followed, Detoxed Safely, Responded well, 

Discharged Condition Good, Rehab Referral Accepted





- Medication


Discharge Medications: 


Ambulatory Orders





Metformin HCl [Glucophage] 1,000 mg PO BID 02/11/19 


Pregabalin [Lyrica -] 150 mg PO TID 02/11/19 


Risperidone [Risperdal] 3 mg PO HS 02/11/19 


Clonidine HCl 0.2 mg PO HS 03/29/19 


Disulfiram [Antabuse] 500 mg PO DAILY 03/29/19 


Famotidine [Pepcid -] 20 mg PO BID 03/29/19 


Insulin Glargine,Hum.rec.anlog [Lantus] 19 unit SQ HS 03/29/19 


Lithium Carbonate [Eskalith -] 450 mg PO BID 03/29/19 


Oxcarbazepine [Oxtellar Xr] 600 mg PO BID 03/29/19 


Pantoprazole Sodium [Protonix -] 40 mg PO DAILY 03/29/19 


Sitagliptin Phosphate [Januvia -] 100 mg PO DAILY@0700 03/29/19 











- Diagnosis


(1) Cocaine dependence


Current Visit: Yes   Status: Chronic   


Qualifiers: 


   Substance use status: uncomplicated   Qualified Code(s): F14.20 - Cocaine 

dependence, uncomplicated   





(2) History of posttraumatic stress disorder (PTSD)


Current Visit: Yes   Status: Chronic   





(3) Insomnia


Current Visit: Yes   Status: Chronic   





(4) Nicotine dependence


Current Visit: Yes   Status: Chronic   


Qualifiers: 


   Nicotine product type: cigarettes   Substance use status: uncomplicated   

Qualified Code(s): F17.210 - Nicotine dependence, cigarettes, uncomplicated   





(5) Opioid dependence


Current Visit: Yes   Status: Chronic   


Qualifiers: 


   Substance use status: uncomplicated   Qualified Code(s): F11.20 - Opioid 

dependence, uncomplicated   





(6) Schizoaffective disorder


Current Visit: Yes   Status: Chronic   





(7) Substance induced mood disorder


Current Visit: Yes   Status: Chronic   





- AMA


Did Patient Leave Against Medical Advice: No (referred to [facility]hab

)

## 2019-04-02 NOTE — PN
[facility] Progress Note


Note: 





Psychiatry


Attending's note (follow-up) :


Lithium level = 0.2


Indicative of non-adherence to medication.


Noted report of discharge on this date.


Follow-up with OPD psychiatrist, Dr Dahl.


For continuity of care (scripts not needed).

## 2021-10-01 ENCOUNTER — HOSPITAL ENCOUNTER (INPATIENT)
Dept: HOSPITAL 74 - YASAS | Age: 46
LOS: 5 days | Discharge: OTHER INSTITUTION | DRG: 773 | End: 2021-10-06
Attending: ALLERGY & IMMUNOLOGY | Admitting: ALLERGY & IMMUNOLOGY
Payer: COMMERCIAL

## 2021-10-01 VITALS — BODY MASS INDEX: 30.9 KG/M2

## 2021-10-01 DIAGNOSIS — M54.50: ICD-10-CM

## 2021-10-01 DIAGNOSIS — G89.29: ICD-10-CM

## 2021-10-01 DIAGNOSIS — G62.9: ICD-10-CM

## 2021-10-01 DIAGNOSIS — F17.210: ICD-10-CM

## 2021-10-01 DIAGNOSIS — F10.230: ICD-10-CM

## 2021-10-01 DIAGNOSIS — M79.7: ICD-10-CM

## 2021-10-01 DIAGNOSIS — F19.24: ICD-10-CM

## 2021-10-01 DIAGNOSIS — F19.282: ICD-10-CM

## 2021-10-01 DIAGNOSIS — Z79.4: ICD-10-CM

## 2021-10-01 DIAGNOSIS — E11.9: ICD-10-CM

## 2021-10-01 DIAGNOSIS — F12.20: ICD-10-CM

## 2021-10-01 DIAGNOSIS — K21.9: ICD-10-CM

## 2021-10-01 DIAGNOSIS — F14.20: ICD-10-CM

## 2021-10-01 DIAGNOSIS — F11.23: Primary | ICD-10-CM

## 2021-10-01 DIAGNOSIS — M48.061: ICD-10-CM

## 2021-10-01 PROCEDURE — U0003 INFECTIOUS AGENT DETECTION BY NUCLEIC ACID (DNA OR RNA); SEVERE ACUTE RESPIRATORY SYNDROME CORONAVIRUS 2 (SARS-COV-2) (CORONAVIRUS DISEASE [COVID-19]), AMPLIFIED PROBE TECHNIQUE, MAKING USE OF HIGH THROUGHPUT TECHNOLOGIES AS DESCRIBED BY CMS-2020-01-R: HCPCS

## 2021-10-01 PROCEDURE — G0008 ADMIN INFLUENZA VIRUS VAC: HCPCS

## 2021-10-01 PROCEDURE — C9803 HOPD COVID-19 SPEC COLLECT: HCPCS

## 2021-10-01 PROCEDURE — U0005 INFEC AGEN DETEC AMPLI PROBE: HCPCS

## 2021-10-01 PROCEDURE — G0009 ADMIN PNEUMOCOCCAL VACCINE: HCPCS

## 2021-10-01 PROCEDURE — HZ2ZZZZ DETOXIFICATION SERVICES FOR SUBSTANCE ABUSE TREATMENT: ICD-10-PCS | Performed by: ALLERGY & IMMUNOLOGY

## 2021-10-01 RX ADMIN — ATORVASTATIN CALCIUM SCH MG: 10 TABLET, FILM COATED ORAL at 22:27

## 2021-10-01 RX ADMIN — METFORMIN HYDROCHLORIDE SCH MG: 500 TABLET ORAL at 17:41

## 2021-10-01 RX ADMIN — Medication SCH MG: at 22:28

## 2021-10-01 RX ADMIN — HYDROXYZINE PAMOATE PRN MG: 25 CAPSULE ORAL at 22:29

## 2021-10-01 RX ADMIN — NICOTINE PRN MG: 4 INHALANT RESPIRATORY (INHALATION) at 20:50

## 2021-10-01 RX ADMIN — Medication SCH MG: at 22:27

## 2021-10-01 RX ADMIN — INSULIN ASPART SCH UNIT: 100 INJECTION, SOLUTION INTRAVENOUS; SUBCUTANEOUS at 17:42

## 2021-10-01 RX ADMIN — METHOCARBAMOL PRN MG: 500 TABLET ORAL at 22:28

## 2021-10-01 RX ADMIN — GABAPENTIN SCH MG: 100 CAPSULE ORAL at 22:27

## 2021-10-02 LAB
ALBUMIN SERPL-MCNC: 3.8 G/DL (ref 3.4–5)
ALP SERPL-CCNC: 105 U/L (ref 45–117)
ALT SERPL-CCNC: 25 U/L (ref 13–61)
AST SERPL-CCNC: 22 U/L (ref 15–37)
BILIRUB SERPL-MCNC: 0.2 MG/DL (ref 0.2–1)
BUN SERPL-MCNC: 13.8 MG/DL (ref 7–18)
CALCIUM SERPL-MCNC: 9.9 MG/DL (ref 8.5–10.1)
CHLORIDE SERPL-SCNC: 101 MMOL/L (ref 98–107)
CO2 SERPL-SCNC: 25 MMOL/L (ref 21–32)
CREAT SERPL-MCNC: 0.9 MG/DL (ref 0.55–1.3)
DEPRECATED RDW RBC AUTO: 15.9 % (ref 11.6–15.6)
GLUCOSE SERPL-MCNC: 172 MG/DL (ref 74–106)
HCT VFR BLD CALC: 46.1 % (ref 32.4–45.2)
HGB BLD-MCNC: 15.7 GM/DL (ref 10.7–15.3)
MCH RBC QN AUTO: 30.1 PG (ref 25.7–33.7)
MCHC RBC AUTO-ENTMCNC: 34 G/DL (ref 32–36)
MCV RBC: 88.7 FL (ref 80–96)
PLATELET # BLD AUTO: 406 10^3/UL (ref 134–434)
PMV BLD: 9.3 FL (ref 7.5–11.1)
PROT SERPL-MCNC: 7.4 G/DL (ref 6.4–8.2)
RBC # BLD AUTO: 5.2 M/MM3 (ref 3.6–5.2)
SODIUM SERPL-SCNC: 136 MMOL/L (ref 136–145)
WBC # BLD AUTO: 11.1 K/MM3 (ref 4–10)

## 2021-10-02 RX ADMIN — Medication SCH MG: at 22:04

## 2021-10-02 RX ADMIN — PANTOPRAZOLE SODIUM SCH MG: 40 TABLET, DELAYED RELEASE ORAL at 10:34

## 2021-10-02 RX ADMIN — NICOTINE PRN MG: 4 INHALANT RESPIRATORY (INHALATION) at 22:08

## 2021-10-02 RX ADMIN — HYDROXYZINE PAMOATE PRN MG: 25 CAPSULE ORAL at 05:52

## 2021-10-02 RX ADMIN — INSULIN ASPART SCH UNIT: 100 INJECTION, SOLUTION INTRAVENOUS; SUBCUTANEOUS at 17:33

## 2021-10-02 RX ADMIN — Medication SCH MG: at 22:05

## 2021-10-02 RX ADMIN — METHOCARBAMOL PRN MG: 500 TABLET ORAL at 17:31

## 2021-10-02 RX ADMIN — METFORMIN HYDROCHLORIDE SCH MG: 500 TABLET ORAL at 17:29

## 2021-10-02 RX ADMIN — GABAPENTIN SCH MG: 100 CAPSULE ORAL at 14:00

## 2021-10-02 RX ADMIN — METHOCARBAMOL PRN MG: 500 TABLET ORAL at 10:34

## 2021-10-02 RX ADMIN — METFORMIN HYDROCHLORIDE SCH MG: 500 TABLET ORAL at 06:59

## 2021-10-02 RX ADMIN — Medication SCH TAB: at 10:34

## 2021-10-02 RX ADMIN — GABAPENTIN SCH MG: 100 CAPSULE ORAL at 05:50

## 2021-10-02 RX ADMIN — GABAPENTIN SCH MG: 100 CAPSULE ORAL at 22:04

## 2021-10-02 RX ADMIN — INSULIN ASPART SCH UNIT: 100 INJECTION, SOLUTION INTRAVENOUS; SUBCUTANEOUS at 07:00

## 2021-10-02 RX ADMIN — INSULIN ASPART SCH UNIT: 100 INJECTION, SOLUTION INTRAVENOUS; SUBCUTANEOUS at 12:03

## 2021-10-02 RX ADMIN — ATORVASTATIN CALCIUM SCH MG: 10 TABLET, FILM COATED ORAL at 22:05

## 2021-10-03 LAB — ANION GAP SERPL CALC-SCNC: 11 MMOL/L (ref 8–16)

## 2021-10-03 RX ADMIN — Medication SCH MG: at 22:00

## 2021-10-03 RX ADMIN — INSULIN ASPART SCH UNIT: 100 INJECTION, SOLUTION INTRAVENOUS; SUBCUTANEOUS at 12:16

## 2021-10-03 RX ADMIN — GABAPENTIN SCH MG: 100 CAPSULE ORAL at 13:53

## 2021-10-03 RX ADMIN — INSULIN ASPART SCH UNIT: 100 INJECTION, SOLUTION INTRAVENOUS; SUBCUTANEOUS at 17:47

## 2021-10-03 RX ADMIN — METFORMIN HYDROCHLORIDE SCH MG: 500 TABLET ORAL at 17:47

## 2021-10-03 RX ADMIN — ATORVASTATIN CALCIUM SCH MG: 10 TABLET, FILM COATED ORAL at 22:00

## 2021-10-03 RX ADMIN — Medication SCH MG: at 21:59

## 2021-10-03 RX ADMIN — GABAPENTIN SCH MG: 100 CAPSULE ORAL at 06:19

## 2021-10-03 RX ADMIN — METHOCARBAMOL PRN MG: 500 TABLET ORAL at 17:50

## 2021-10-03 RX ADMIN — GABAPENTIN SCH MG: 100 CAPSULE ORAL at 21:59

## 2021-10-03 RX ADMIN — PANTOPRAZOLE SODIUM SCH MG: 40 TABLET, DELAYED RELEASE ORAL at 10:41

## 2021-10-03 RX ADMIN — Medication SCH TAB: at 10:41

## 2021-10-03 RX ADMIN — NICOTINE PRN MG: 4 INHALANT RESPIRATORY (INHALATION) at 10:46

## 2021-10-03 RX ADMIN — METFORMIN HYDROCHLORIDE SCH MG: 500 TABLET ORAL at 06:19

## 2021-10-03 RX ADMIN — INSULIN ASPART SCH UNIT: 100 INJECTION, SOLUTION INTRAVENOUS; SUBCUTANEOUS at 07:06

## 2021-10-04 RX ADMIN — INSULIN ASPART SCH UNIT: 100 INJECTION, SOLUTION INTRAVENOUS; SUBCUTANEOUS at 17:33

## 2021-10-04 RX ADMIN — GABAPENTIN SCH MG: 100 CAPSULE ORAL at 22:15

## 2021-10-04 RX ADMIN — INSULIN ASPART SCH: 100 INJECTION, SOLUTION INTRAVENOUS; SUBCUTANEOUS at 10:37

## 2021-10-04 RX ADMIN — ATORVASTATIN CALCIUM SCH MG: 10 TABLET, FILM COATED ORAL at 22:15

## 2021-10-04 RX ADMIN — Medication SCH TAB: at 10:25

## 2021-10-04 RX ADMIN — GABAPENTIN SCH MG: 100 CAPSULE ORAL at 05:59

## 2021-10-04 RX ADMIN — METFORMIN HYDROCHLORIDE SCH MG: 500 TABLET ORAL at 17:33

## 2021-10-04 RX ADMIN — PANTOPRAZOLE SODIUM SCH MG: 40 TABLET, DELAYED RELEASE ORAL at 10:25

## 2021-10-04 RX ADMIN — HYDROXYZINE PAMOATE PRN MG: 25 CAPSULE ORAL at 06:00

## 2021-10-04 RX ADMIN — GABAPENTIN SCH MG: 100 CAPSULE ORAL at 13:21

## 2021-10-04 RX ADMIN — INSULIN ASPART SCH UNIT: 100 INJECTION, SOLUTION INTRAVENOUS; SUBCUTANEOUS at 06:06

## 2021-10-04 RX ADMIN — Medication SCH MG: at 22:15

## 2021-10-04 RX ADMIN — NICOTINE PRN MG: 4 INHALANT RESPIRATORY (INHALATION) at 06:01

## 2021-10-04 RX ADMIN — METFORMIN HYDROCHLORIDE SCH MG: 500 TABLET ORAL at 06:01

## 2021-10-04 RX ADMIN — LITHIUM CARBONATE SCH MG: 150 CAPSULE, GELATIN COATED ORAL at 22:15

## 2021-10-05 RX ADMIN — Medication SCH TAB: at 10:35

## 2021-10-05 RX ADMIN — ATORVASTATIN CALCIUM SCH MG: 10 TABLET, FILM COATED ORAL at 22:07

## 2021-10-05 RX ADMIN — METFORMIN HYDROCHLORIDE SCH MG: 500 TABLET ORAL at 07:31

## 2021-10-05 RX ADMIN — Medication SCH MG: at 22:07

## 2021-10-05 RX ADMIN — LITHIUM CARBONATE SCH MG: 150 CAPSULE, GELATIN COATED ORAL at 10:35

## 2021-10-05 RX ADMIN — INSULIN ASPART SCH UNIT: 100 INJECTION, SOLUTION INTRAVENOUS; SUBCUTANEOUS at 11:26

## 2021-10-05 RX ADMIN — LITHIUM CARBONATE SCH MG: 150 CAPSULE, GELATIN COATED ORAL at 22:07

## 2021-10-05 RX ADMIN — METFORMIN HYDROCHLORIDE SCH MG: 500 TABLET ORAL at 17:49

## 2021-10-05 RX ADMIN — GABAPENTIN SCH MG: 100 CAPSULE ORAL at 07:31

## 2021-10-05 RX ADMIN — GABAPENTIN SCH MG: 100 CAPSULE ORAL at 14:01

## 2021-10-05 RX ADMIN — INSULIN ASPART SCH UNIT: 100 INJECTION, SOLUTION INTRAVENOUS; SUBCUTANEOUS at 17:51

## 2021-10-05 RX ADMIN — NICOTINE PRN MG: 4 INHALANT RESPIRATORY (INHALATION) at 17:50

## 2021-10-05 RX ADMIN — INSULIN ASPART SCH UNIT: 100 INJECTION, SOLUTION INTRAVENOUS; SUBCUTANEOUS at 07:39

## 2021-10-05 RX ADMIN — NICOTINE PRN MG: 4 INHALANT RESPIRATORY (INHALATION) at 07:40

## 2021-10-05 RX ADMIN — PANTOPRAZOLE SODIUM SCH MG: 40 TABLET, DELAYED RELEASE ORAL at 10:35

## 2021-10-05 RX ADMIN — GABAPENTIN SCH MG: 100 CAPSULE ORAL at 22:07

## 2021-10-05 RX ADMIN — HYDROXYZINE PAMOATE PRN MG: 25 CAPSULE ORAL at 09:03

## 2021-10-06 ENCOUNTER — HOSPITAL ENCOUNTER (INPATIENT)
Dept: HOSPITAL 74 - YASAS | Age: 46
LOS: 3 days | Discharge: LEFT BEFORE DISCHARGE | DRG: 770 | End: 2021-10-09
Attending: ALLERGY & IMMUNOLOGY | Admitting: ALLERGY & IMMUNOLOGY
Payer: COMMERCIAL

## 2021-10-06 VITALS — DIASTOLIC BLOOD PRESSURE: 61 MMHG | HEART RATE: 109 BPM | TEMPERATURE: 96.8 F | SYSTOLIC BLOOD PRESSURE: 93 MMHG

## 2021-10-06 DIAGNOSIS — M79.7: ICD-10-CM

## 2021-10-06 DIAGNOSIS — F25.9: ICD-10-CM

## 2021-10-06 DIAGNOSIS — F32.9: ICD-10-CM

## 2021-10-06 DIAGNOSIS — Z79.4: ICD-10-CM

## 2021-10-06 DIAGNOSIS — F11.20: Primary | ICD-10-CM

## 2021-10-06 DIAGNOSIS — G62.9: ICD-10-CM

## 2021-10-06 DIAGNOSIS — F43.10: ICD-10-CM

## 2021-10-06 DIAGNOSIS — E11.9: ICD-10-CM

## 2021-10-06 DIAGNOSIS — F14.20: ICD-10-CM

## 2021-10-06 DIAGNOSIS — F17.210: ICD-10-CM

## 2021-10-06 DIAGNOSIS — G47.00: ICD-10-CM

## 2021-10-06 PROCEDURE — HZ42ZZZ GROUP COUNSELING FOR SUBSTANCE ABUSE TREATMENT, COGNITIVE-BEHAVIORAL: ICD-10-PCS | Performed by: ALLERGY & IMMUNOLOGY

## 2021-10-06 RX ADMIN — NICOTINE PRN MG: 4 INHALANT RESPIRATORY (INHALATION) at 21:07

## 2021-10-06 RX ADMIN — Medication SCH MG: at 21:06

## 2021-10-06 RX ADMIN — GABAPENTIN SCH MG: 100 CAPSULE ORAL at 06:32

## 2021-10-06 RX ADMIN — INSULIN ASPART SCH UNITS: 100 INJECTION, SOLUTION INTRAVENOUS; SUBCUTANEOUS at 17:51

## 2021-10-06 RX ADMIN — ATORVASTATIN CALCIUM SCH MG: 10 TABLET, FILM COATED ORAL at 21:05

## 2021-10-06 RX ADMIN — LITHIUM CARBONATE SCH MG: 150 CAPSULE, GELATIN COATED ORAL at 10:35

## 2021-10-06 RX ADMIN — PANTOPRAZOLE SODIUM SCH MG: 40 TABLET, DELAYED RELEASE ORAL at 10:34

## 2021-10-06 RX ADMIN — Medication SCH MG: at 21:04

## 2021-10-06 RX ADMIN — METFORMIN HYDROCHLORIDE SCH MG: 500 TABLET ORAL at 06:32

## 2021-10-06 RX ADMIN — INSULIN ASPART SCH UNIT: 100 INJECTION, SOLUTION INTRAVENOUS; SUBCUTANEOUS at 12:10

## 2021-10-06 RX ADMIN — Medication SCH TAB: at 10:34

## 2021-10-06 RX ADMIN — INSULIN ASPART SCH UNIT: 100 INJECTION, SOLUTION INTRAVENOUS; SUBCUTANEOUS at 06:36

## 2021-10-06 RX ADMIN — LITHIUM CARBONATE SCH MG: 150 CAPSULE, GELATIN COATED ORAL at 21:05

## 2021-10-06 RX ADMIN — MAGNESIUM HYDROXIDE PRN ML: 400 SUSPENSION ORAL at 18:13

## 2021-10-06 RX ADMIN — METFORMIN HYDROCHLORIDE SCH MG: 500 TABLET ORAL at 17:50

## 2021-10-07 RX ADMIN — METFORMIN HYDROCHLORIDE SCH MG: 500 TABLET ORAL at 16:49

## 2021-10-07 RX ADMIN — Medication SCH MG: at 22:02

## 2021-10-07 RX ADMIN — INSULIN ASPART SCH: 100 INJECTION, SOLUTION INTRAVENOUS; SUBCUTANEOUS at 06:34

## 2021-10-07 RX ADMIN — Medication SCH MG: at 22:03

## 2021-10-07 RX ADMIN — LITHIUM CARBONATE SCH MG: 150 CAPSULE, GELATIN COATED ORAL at 10:56

## 2021-10-07 RX ADMIN — INSULIN ASPART SCH UNITS: 100 INJECTION, SOLUTION INTRAVENOUS; SUBCUTANEOUS at 16:49

## 2021-10-07 RX ADMIN — ATORVASTATIN CALCIUM SCH MG: 10 TABLET, FILM COATED ORAL at 22:02

## 2021-10-07 RX ADMIN — Medication SCH TAB: at 10:56

## 2021-10-07 RX ADMIN — LITHIUM CARBONATE SCH MG: 300 CAPSULE, GELATIN COATED ORAL at 22:02

## 2021-10-07 RX ADMIN — NICOTINE SCH: 7 PATCH TRANSDERMAL at 10:56

## 2021-10-07 RX ADMIN — METFORMIN HYDROCHLORIDE SCH MG: 500 TABLET ORAL at 06:35

## 2021-10-08 RX ADMIN — INSULIN ASPART SCH: 100 INJECTION, SOLUTION INTRAVENOUS; SUBCUTANEOUS at 06:54

## 2021-10-08 RX ADMIN — LITHIUM CARBONATE SCH MG: 300 CAPSULE, GELATIN COATED ORAL at 10:08

## 2021-10-08 RX ADMIN — LITHIUM CARBONATE SCH MG: 300 CAPSULE, GELATIN COATED ORAL at 21:14

## 2021-10-08 RX ADMIN — Medication SCH MG: at 21:13

## 2021-10-08 RX ADMIN — Medication SCH TAB: at 10:08

## 2021-10-08 RX ADMIN — METFORMIN HYDROCHLORIDE SCH MG: 500 TABLET ORAL at 16:58

## 2021-10-08 RX ADMIN — INSULIN ASPART SCH UNITS: 100 INJECTION, SOLUTION INTRAVENOUS; SUBCUTANEOUS at 17:00

## 2021-10-08 RX ADMIN — NICOTINE PRN MG: 4 INHALANT RESPIRATORY (INHALATION) at 17:49

## 2021-10-08 RX ADMIN — NICOTINE SCH: 7 PATCH TRANSDERMAL at 10:08

## 2021-10-08 RX ADMIN — Medication SCH MG: at 21:15

## 2021-10-08 RX ADMIN — ATORVASTATIN CALCIUM SCH MG: 10 TABLET, FILM COATED ORAL at 21:14

## 2021-10-08 RX ADMIN — METFORMIN HYDROCHLORIDE SCH MG: 500 TABLET ORAL at 06:54

## 2021-10-08 RX ADMIN — MAGNESIUM HYDROXIDE PRN ML: 400 SUSPENSION ORAL at 10:08

## 2021-10-09 VITALS — TEMPERATURE: 95.5 F | HEART RATE: 93 BPM | SYSTOLIC BLOOD PRESSURE: 100 MMHG | DIASTOLIC BLOOD PRESSURE: 65 MMHG

## 2021-10-09 RX ADMIN — INSULIN ASPART SCH UNITS: 100 INJECTION, SOLUTION INTRAVENOUS; SUBCUTANEOUS at 16:43

## 2021-10-09 RX ADMIN — METFORMIN HYDROCHLORIDE SCH MG: 500 TABLET ORAL at 07:02

## 2021-10-09 RX ADMIN — INSULIN ASPART SCH: 100 INJECTION, SOLUTION INTRAVENOUS; SUBCUTANEOUS at 07:02

## 2021-10-09 RX ADMIN — METFORMIN HYDROCHLORIDE SCH MG: 500 TABLET ORAL at 16:46

## 2021-10-09 RX ADMIN — Medication SCH TAB: at 10:37

## 2021-10-09 RX ADMIN — LITHIUM CARBONATE SCH MG: 300 CAPSULE, GELATIN COATED ORAL at 10:38

## 2021-10-09 RX ADMIN — NICOTINE SCH: 7 PATCH TRANSDERMAL at 10:37

## 2021-12-15 ENCOUNTER — HOSPITAL ENCOUNTER (INPATIENT)
Dept: HOSPITAL 74 - YASAS | Age: 46
LOS: 5 days | Discharge: HOME | DRG: 772 | End: 2021-12-20
Attending: ALLERGY & IMMUNOLOGY | Admitting: ALLERGY & IMMUNOLOGY
Payer: COMMERCIAL

## 2021-12-15 VITALS — BODY MASS INDEX: 27.4 KG/M2

## 2021-12-15 DIAGNOSIS — Z79.84: ICD-10-CM

## 2021-12-15 DIAGNOSIS — F14.20: Primary | ICD-10-CM

## 2021-12-15 DIAGNOSIS — M54.59: ICD-10-CM

## 2021-12-15 DIAGNOSIS — F19.280: ICD-10-CM

## 2021-12-15 DIAGNOSIS — E11.42: ICD-10-CM

## 2021-12-15 DIAGNOSIS — F17.210: ICD-10-CM

## 2021-12-15 DIAGNOSIS — J45.909: ICD-10-CM

## 2021-12-15 DIAGNOSIS — F12.20: ICD-10-CM

## 2021-12-15 DIAGNOSIS — F19.24: ICD-10-CM

## 2021-12-15 DIAGNOSIS — G89.29: ICD-10-CM

## 2021-12-15 DIAGNOSIS — E78.5: ICD-10-CM

## 2021-12-15 DIAGNOSIS — Z56.0: ICD-10-CM

## 2021-12-15 DIAGNOSIS — M79.7: ICD-10-CM

## 2021-12-15 DIAGNOSIS — F43.10: ICD-10-CM

## 2021-12-15 DIAGNOSIS — Z91.51: ICD-10-CM

## 2021-12-15 DIAGNOSIS — F19.282: ICD-10-CM

## 2021-12-15 DIAGNOSIS — K21.9: ICD-10-CM

## 2021-12-15 PROCEDURE — U0005 INFEC AGEN DETEC AMPLI PROBE: HCPCS

## 2021-12-15 PROCEDURE — U0003 INFECTIOUS AGENT DETECTION BY NUCLEIC ACID (DNA OR RNA); SEVERE ACUTE RESPIRATORY SYNDROME CORONAVIRUS 2 (SARS-COV-2) (CORONAVIRUS DISEASE [COVID-19]), AMPLIFIED PROBE TECHNIQUE, MAKING USE OF HIGH THROUGHPUT TECHNOLOGIES AS DESCRIBED BY CMS-2020-01-R: HCPCS

## 2021-12-15 PROCEDURE — C9803 HOPD COVID-19 SPEC COLLECT: HCPCS

## 2021-12-15 PROCEDURE — HZ42ZZZ GROUP COUNSELING FOR SUBSTANCE ABUSE TREATMENT, COGNITIVE-BEHAVIORAL: ICD-10-PCS | Performed by: ALLERGY & IMMUNOLOGY

## 2021-12-15 RX ADMIN — Medication SCH MG: at 22:10

## 2021-12-15 RX ADMIN — HYDROXYZINE PAMOATE SCH MG: 25 CAPSULE ORAL at 18:34

## 2021-12-15 RX ADMIN — Medication SCH MG: at 22:17

## 2021-12-15 RX ADMIN — INSULIN DETEMIR SCH UNIT: 100 INJECTION, SOLUTION SUBCUTANEOUS at 22:14

## 2021-12-15 RX ADMIN — METRONIDAZOLE SCH: 7.5 GEL VAGINAL at 23:39

## 2021-12-15 RX ADMIN — HYDROXYZINE PAMOATE SCH MG: 25 CAPSULE ORAL at 22:10

## 2021-12-15 SDOH — ECONOMIC STABILITY - INCOME SECURITY: UNEMPLOYMENT, UNSPECIFIED: Z56.0

## 2021-12-16 LAB
ALBUMIN SERPL-MCNC: 2.7 G/DL (ref 3.4–5)
ALP SERPL-CCNC: 101 U/L (ref 45–117)
ALT SERPL-CCNC: 14 U/L (ref 13–61)
ANION GAP SERPL CALC-SCNC: 8 MMOL/L (ref 8–16)
APPEARANCE UR: CLEAR
AST SERPL-CCNC: 13 U/L (ref 15–37)
BILIRUB SERPL-MCNC: 0.3 MG/DL (ref 0.2–1)
BILIRUB UR STRIP.AUTO-MCNC: NEGATIVE MG/DL
BUN SERPL-MCNC: 8.2 MG/DL (ref 7–18)
CALCIUM SERPL-MCNC: 9.3 MG/DL (ref 8.5–10.1)
CHLORIDE SERPL-SCNC: 103 MMOL/L (ref 98–107)
CO2 SERPL-SCNC: 29 MMOL/L (ref 21–32)
COLOR UR: YELLOW
CREAT SERPL-MCNC: 0.6 MG/DL (ref 0.55–1.3)
DEPRECATED RDW RBC AUTO: 13.9 % (ref 11.6–15.6)
GLUCOSE SERPL-MCNC: 232 MG/DL (ref 74–106)
HCT VFR BLD CALC: 43.4 % (ref 32.4–45.2)
HGB BLD-MCNC: 15.1 GM/DL (ref 10.7–15.3)
KETONES UR QL STRIP: NEGATIVE
LEUKOCYTE ESTERASE UR QL STRIP.AUTO: NEGATIVE
MCH RBC QN AUTO: 30.7 PG (ref 25.7–33.7)
MCHC RBC AUTO-ENTMCNC: 34.8 G/DL (ref 32–36)
MCV RBC: 88.3 FL (ref 80–96)
NITRITE UR QL STRIP: NEGATIVE
PH UR: 5 [PH] (ref 5–8)
PLATELET # BLD AUTO: 342 10^3/UL (ref 134–434)
PMV BLD: 9.5 FL (ref 7.5–11.1)
PROT SERPL-MCNC: 6.2 G/DL (ref 6.4–8.2)
PROT UR QL STRIP: (no result)
PROT UR QL STRIP: NEGATIVE
RBC # BLD AUTO: 4.92 M/MM3 (ref 3.6–5.2)
SODIUM SERPL-SCNC: 139 MMOL/L (ref 136–145)
SP GR UR: 1.04 (ref 1.01–1.03)
UROBILINOGEN UR STRIP-MCNC: 0.2 MG/DL (ref 0.2–1)
WBC # BLD AUTO: 7.8 K/MM3 (ref 4–10)

## 2021-12-16 RX ADMIN — METRONIDAZOLE SCH APPLIC: 7.5 GEL VAGINAL at 22:22

## 2021-12-16 RX ADMIN — METFORMIN HYDROCHLORIDE SCH MG: 500 TABLET ORAL at 16:25

## 2021-12-16 RX ADMIN — Medication SCH MG: at 22:20

## 2021-12-16 RX ADMIN — METFORMIN HYDROCHLORIDE SCH MG: 500 TABLET ORAL at 06:48

## 2021-12-16 RX ADMIN — PANTOPRAZOLE SODIUM SCH MG: 40 TABLET, DELAYED RELEASE ORAL at 11:12

## 2021-12-16 RX ADMIN — LITHIUM CARBONATE SCH MG: 300 CAPSULE, GELATIN COATED ORAL at 22:20

## 2021-12-16 RX ADMIN — Medication SCH MG: at 22:19

## 2021-12-16 RX ADMIN — HYDROXYZINE PAMOATE SCH MG: 25 CAPSULE ORAL at 22:20

## 2021-12-16 RX ADMIN — HYDROXYZINE PAMOATE SCH MG: 25 CAPSULE ORAL at 11:12

## 2021-12-16 RX ADMIN — HYDROXYZINE PAMOATE SCH MG: 25 CAPSULE ORAL at 17:34

## 2021-12-16 RX ADMIN — LITHIUM CARBONATE SCH MG: 300 CAPSULE, GELATIN COATED ORAL at 14:39

## 2021-12-16 RX ADMIN — HYDROXYZINE PAMOATE SCH MG: 25 CAPSULE ORAL at 14:40

## 2021-12-16 RX ADMIN — Medication SCH TAB: at 11:11

## 2021-12-16 RX ADMIN — INSULIN DETEMIR SCH UNIT: 100 INJECTION, SOLUTION SUBCUTANEOUS at 22:19

## 2021-12-16 RX ADMIN — HYDROXYZINE PAMOATE SCH: 25 CAPSULE ORAL at 06:48

## 2021-12-16 RX ADMIN — NICOTINE SCH: 7 PATCH TRANSDERMAL at 11:11

## 2021-12-17 RX ADMIN — METFORMIN HYDROCHLORIDE SCH MG: 500 TABLET ORAL at 16:44

## 2021-12-17 RX ADMIN — METFORMIN HYDROCHLORIDE SCH: 500 TABLET ORAL at 07:03

## 2021-12-17 RX ADMIN — NICOTINE PRN MG: 4 INHALANT RESPIRATORY (INHALATION) at 16:46

## 2021-12-17 RX ADMIN — HYDROXYZINE PAMOATE SCH: 25 CAPSULE ORAL at 15:16

## 2021-12-17 RX ADMIN — METRONIDAZOLE SCH APPLIC: 7.5 GEL VAGINAL at 22:04

## 2021-12-17 RX ADMIN — Medication SCH TAB: at 11:06

## 2021-12-17 RX ADMIN — HYDROXYZINE PAMOATE SCH: 25 CAPSULE ORAL at 20:24

## 2021-12-17 RX ADMIN — INSULIN DETEMIR SCH UNIT: 100 INJECTION, SOLUTION SUBCUTANEOUS at 22:09

## 2021-12-17 RX ADMIN — LITHIUM CARBONATE SCH MG: 300 CAPSULE, GELATIN COATED ORAL at 22:04

## 2021-12-17 RX ADMIN — HYDROXYZINE PAMOATE SCH MG: 25 CAPSULE ORAL at 22:05

## 2021-12-17 RX ADMIN — HYDROXYZINE PAMOATE SCH MG: 25 CAPSULE ORAL at 11:06

## 2021-12-17 RX ADMIN — Medication SCH MG: at 22:05

## 2021-12-17 RX ADMIN — PANTOPRAZOLE SODIUM SCH MG: 40 TABLET, DELAYED RELEASE ORAL at 11:06

## 2021-12-17 RX ADMIN — LITHIUM CARBONATE SCH MG: 300 CAPSULE, GELATIN COATED ORAL at 11:05

## 2021-12-17 RX ADMIN — HYDROXYZINE PAMOATE SCH: 25 CAPSULE ORAL at 07:03

## 2021-12-17 RX ADMIN — NICOTINE SCH: 7 PATCH TRANSDERMAL at 11:06

## 2021-12-18 RX ADMIN — Medication SCH MG: at 21:59

## 2021-12-18 RX ADMIN — HYDROXYZINE PAMOATE SCH MG: 25 CAPSULE ORAL at 10:50

## 2021-12-18 RX ADMIN — METFORMIN HYDROCHLORIDE SCH MG: 500 TABLET ORAL at 06:58

## 2021-12-18 RX ADMIN — METFORMIN HYDROCHLORIDE SCH MG: 500 TABLET ORAL at 16:42

## 2021-12-18 RX ADMIN — Medication SCH MG: at 21:58

## 2021-12-18 RX ADMIN — NICOTINE PRN MG: 4 INHALANT RESPIRATORY (INHALATION) at 13:52

## 2021-12-18 RX ADMIN — HYDROXYZINE PAMOATE SCH MG: 25 CAPSULE ORAL at 06:59

## 2021-12-18 RX ADMIN — HYDROXYZINE PAMOATE SCH MG: 25 CAPSULE ORAL at 13:51

## 2021-12-18 RX ADMIN — Medication SCH TAB: at 10:49

## 2021-12-18 RX ADMIN — INSULIN DETEMIR SCH UNIT: 100 INJECTION, SOLUTION SUBCUTANEOUS at 21:59

## 2021-12-18 RX ADMIN — METRONIDAZOLE SCH APPLIC: 7.5 GEL VAGINAL at 21:58

## 2021-12-18 RX ADMIN — LITHIUM CARBONATE SCH MG: 300 CAPSULE, GELATIN COATED ORAL at 10:49

## 2021-12-18 RX ADMIN — HYDROXYZINE PAMOATE SCH MG: 25 CAPSULE ORAL at 17:22

## 2021-12-18 RX ADMIN — NICOTINE SCH: 7 PATCH TRANSDERMAL at 10:50

## 2021-12-18 RX ADMIN — LITHIUM CARBONATE SCH MG: 300 CAPSULE, GELATIN COATED ORAL at 21:58

## 2021-12-18 RX ADMIN — HYDROXYZINE PAMOATE SCH MG: 25 CAPSULE ORAL at 21:58

## 2021-12-18 RX ADMIN — PANTOPRAZOLE SODIUM SCH MG: 40 TABLET, DELAYED RELEASE ORAL at 10:49

## 2021-12-19 RX ADMIN — ALUMINUM HYDROXIDE, MAGNESIUM HYDROXIDE, AND SIMETHICONE PRN ML: 200; 200; 20 SUSPENSION ORAL at 10:56

## 2021-12-19 RX ADMIN — ALUMINUM HYDROXIDE, MAGNESIUM HYDROXIDE, AND SIMETHICONE PRN ML: 200; 200; 20 SUSPENSION ORAL at 16:59

## 2021-12-19 RX ADMIN — LITHIUM CARBONATE SCH MG: 300 CAPSULE, GELATIN COATED ORAL at 21:29

## 2021-12-19 RX ADMIN — HYDROXYZINE PAMOATE SCH: 25 CAPSULE ORAL at 13:54

## 2021-12-19 RX ADMIN — NICOTINE SCH: 7 PATCH TRANSDERMAL at 10:55

## 2021-12-19 RX ADMIN — PANTOPRAZOLE SODIUM SCH MG: 40 TABLET, DELAYED RELEASE ORAL at 10:55

## 2021-12-19 RX ADMIN — METRONIDAZOLE SCH APPLIC: 7.5 GEL VAGINAL at 22:15

## 2021-12-19 RX ADMIN — METFORMIN HYDROCHLORIDE SCH MG: 500 TABLET ORAL at 06:45

## 2021-12-19 RX ADMIN — HYDROXYZINE PAMOATE SCH MG: 25 CAPSULE ORAL at 17:26

## 2021-12-19 RX ADMIN — NICOTINE PRN MG: 4 INHALANT RESPIRATORY (INHALATION) at 21:33

## 2021-12-19 RX ADMIN — METFORMIN HYDROCHLORIDE SCH MG: 500 TABLET ORAL at 16:56

## 2021-12-19 RX ADMIN — HYDROXYZINE PAMOATE SCH MG: 25 CAPSULE ORAL at 22:15

## 2021-12-19 RX ADMIN — Medication SCH MG: at 21:29

## 2021-12-19 RX ADMIN — HYDROXYZINE PAMOATE SCH MG: 25 CAPSULE ORAL at 06:44

## 2021-12-19 RX ADMIN — LITHIUM CARBONATE SCH MG: 300 CAPSULE, GELATIN COATED ORAL at 10:54

## 2021-12-19 RX ADMIN — INSULIN DETEMIR SCH UNIT: 100 INJECTION, SOLUTION SUBCUTANEOUS at 21:29

## 2021-12-19 RX ADMIN — HYDROXYZINE PAMOATE SCH: 25 CAPSULE ORAL at 10:55

## 2021-12-19 RX ADMIN — Medication SCH TAB: at 10:54

## 2021-12-20 VITALS — HEART RATE: 78 BPM | SYSTOLIC BLOOD PRESSURE: 101 MMHG | DIASTOLIC BLOOD PRESSURE: 67 MMHG | TEMPERATURE: 98.5 F

## 2021-12-20 RX ADMIN — HYDROXYZINE PAMOATE SCH: 25 CAPSULE ORAL at 10:00

## 2021-12-20 RX ADMIN — PANTOPRAZOLE SODIUM SCH: 40 TABLET, DELAYED RELEASE ORAL at 10:00

## 2021-12-20 RX ADMIN — LITHIUM CARBONATE SCH: 300 CAPSULE, GELATIN COATED ORAL at 10:00

## 2021-12-20 RX ADMIN — Medication SCH: at 10:00

## 2021-12-20 RX ADMIN — NICOTINE SCH: 7 PATCH TRANSDERMAL at 10:00

## 2021-12-20 RX ADMIN — METFORMIN HYDROCHLORIDE SCH MG: 500 TABLET ORAL at 07:03

## 2021-12-20 RX ADMIN — HYDROXYZINE PAMOATE SCH: 25 CAPSULE ORAL at 06:45
